# Patient Record
(demographics unavailable — no encounter records)

---

## 2024-10-29 NOTE — PHYSICAL EXAM
[Cerumen in canal] : cerumen in canal [Erythematous Oropharynx] : erythematous oropharynx [No Abnormal Lymph Nodes Palpated] : no abnormal lymph nodes palpated [NL] : warm, clear [Warm] : warm [Clear] : clear [Dry] : dry [Enlarged Tonsils] : tonsils not enlarged [Vesicles] : no vesicles [Exudate] : no exudate

## 2024-10-29 NOTE — REVIEW OF SYSTEMS
[Nasal Congestion] : nasal congestion [Sore Throat] : sore throat [Cough] : cough [Congestion] : congestion [Negative] : Skin [Headache] : no headache [Ear Pain] : no ear pain [Nasal Discharge] : no nasal discharge [Sinus Pressure] : no sinus pressure [Tachypnea] : not tachypneic [Wheezing] : no wheezing [Shortness of Breath] : no shortness of breath

## 2024-10-29 NOTE — END OF VISIT
[FreeTextEntry3] : Keven Mijares MD I reviewed the history & physical exam of patient & discussed with the resident. Agree with assessment & management plan as documented in residents note and addendums made as needed above.

## 2024-10-29 NOTE — DISCUSSION/SUMMARY
[FreeTextEntry1] : Patient is a 13 year old who presents for 1 week of worsening cough and sore throat. Normal exam, no fevers  or SOB. Had negative Covid and flu from urgent care but given symptoms, will send RVP to evaluate for Mycoplasma. Pt is able to tolerate PO and has maintained hydration. Symptoms likely related to post-viral cough and post-nasal drip, no indication for further treatment with antibiotics at this time, no red flags  Plan:  - RVP today - Encouraged supportive measures with humidifier, nasal saline sprays, continued hydrated and motrin - Recommended stopping medications prescribed by urgent care due to side effect profile and lack of efficacy  - RTC if symptoms worsen or new concerns arise.

## 2024-10-29 NOTE — HISTORY OF PRESENT ILLNESS
[FreeTextEntry6] : Patient is a 13 year old F who presents for 1 week of cough and sore throat. Last Sunday developed sore throat and cough which has continued. Congestion started yesterday, no runny nose. Describes the cough is productive with thick white mucous, used to be green/yellow last week. Was seen by Urgent care Sunday, had a negative Covid, Flu and strep throat and they prescribed viscous lidocaine and Bromphen. Mother has also been giving Robitussin and Nyquil. Cough is throughout the day but worst at night and she is not able to fall asleep because of the cough. No chest pain, SOB, fevers, N/V/D, headaches, facial pain. Mother bought her Flonase today that she has used twice. Still having pain with swallowing but is able drink enough to stay hydrated. Cough has been getting progressively worse during the day and at night.

## 2025-03-12 NOTE — PHYSICAL EXAM
[NL] : warm, clear [de-identified] : no edema, erythema noted to rt wrist, full ROM, no pain upon palpation

## 2025-03-12 NOTE — HISTORY OF PRESENT ILLNESS
[FreeTextEntry6] : rt wrist pain x1 year, worsening this past week no known injury denies swelling, denies edema reports noticing redness while performing a plank 2 weeks ago

## 2025-03-12 NOTE — DISCUSSION/SUMMARY
[FreeTextEntry1] : 14 YO here for Rt. Wrist Pain xray ordered Ortho referral ED precautions discussed such as increased pain, edema and/or erythema noted, go to ED RTC for WCC/PRN

## 2025-03-21 NOTE — REVIEW OF SYSTEMS
[Joint Pains] : arthralgias [Joint Swelling] : joint swelling  [Nl] : Musculoskeletal [Change in Activity] : no change in activity

## 2025-03-21 NOTE — DATA REVIEWED
[de-identified] : XR right wrist 3 views performed today 3/20/25: No acute displaced fracture noted. Questionable widening of the distal radioulnar joint

## 2025-03-21 NOTE — HISTORY OF PRESENT ILLNESS
[FreeTextEntry1] : Marilyn is a 13Y female who presents with her father for evaluation of right wrist pain for the past 1 year. The pain is intermittent and localized to the distal aspect of the wrist. The pain is intermittent and is associated with swelling. Patient states that the pain got worse over the past week. She has been utilizing wrist brace for comfort. She states that the pain usually gets worse with writing for long period of time and sometimes while doing planks and push-ups. She has been taking OTC pain medication without significant relief. Denies any radiating pain, numbness or any tingling sensation. Here for orthopedic evaluation and management.

## 2025-03-21 NOTE — END OF VISIT
[FreeTextEntry3] : I, Rachid Barclay MD, I personally performed the services described in the documentation, reviewed the documentation recorded by the scribe in my presence and it accurately and completely records my words and actions

## 2025-03-21 NOTE — ASSESSMENT
[FreeTextEntry1] : Marilyn is a 13Y female with right wrist pain and swelling for the past 1 year Today's visit included obtaining history from the parent due to the child's age, the child could not be considered a reliable historian, requiring parent to act as independent historian  Clinical findings and imaging discussed at length with father and patient. XRs right wrist performed today reviewed at length. No acute displaced fracture noted. Questionable widening of the distal radioulnar joint. Given the fact that the patient has been having chronic right wrist pain for the past 1 year with no improvement with conservative management which includes wrist immobilization and OTC pain medication, I am recommending MRI right wrist to r/o soft tissue abnormalities vs TFCC tear. Our  will contact father once the MRI is approved and authorized by the insurance. She will f/u in 3-4 weeks to review MRI results and further treatment plan. All questions answered. Family and patient verbalize understanding of the plan.   ILuh PA-C have acted as scribe and documented the above for Dr. Barclay

## 2025-03-21 NOTE — PHYSICAL EXAM
[FreeTextEntry1] : Gait: Presents ambulating independently without signs of antalgia.  Good coordination and balance noted. GENERAL: alert, cooperative, in NAD SKIN: The skin is intact, warm, pink and dry over the area examined. EYES: Normal conjunctiva, normal eyelids and pupils were equal and round. ENT: normal ears, normal nose and normal lips. CARDIOVASCULAR: brisk capillary refill, but no peripheral edema. RESPIRATORY: The patient is in no apparent respiratory distress. They're taking full deep breaths without use of accessory muscles or evidence of audible wheezes or stridor without the use of a stethoscope. Normal respiratory effort. ABDOMEN: not examined  Focused exam right wrist No bony deformities, inflammation, or erythema.  Soft tissue swelling right wrist  tenderness to palpation over the distal end of the radius.  Pain with extension, flexion, ulnar and radial deviation without stiffness.  Fingers are warm, pink, and moving freely.  Radial pulse is +2 B/L. Brisk capillary refill in all 5 fingers.  Sensation is intact to light touch distally. Nerve innervation of the hand is intact.

## 2025-03-21 NOTE — DATA REVIEWED
[de-identified] : XR right wrist 3 views performed today 3/20/25: No acute displaced fracture noted. Questionable widening of the distal radioulnar joint

## 2025-03-21 NOTE — REASON FOR VISIT
[Initial Evaluation] : an initial evaluation [Patient] : patient [Father] : father [FreeTextEntry1] : right wrist pain

## 2025-04-15 NOTE — PHYSICAL EXAM

## 2025-04-17 NOTE — DISCUSSION/SUMMARY
[Normal Growth] : growth [Normal Development] : development  [No Elimination Concerns] : elimination [Continue Regimen] : feeding [No Skin Concerns] : skin [Normal Sleep Pattern] : sleep [None] : no medical problems [Anticipatory Guidance Given] : Anticipatory guidance addressed as per the history of present illness section [No Vaccines] : no vaccines needed [No Medications] : ~He/She~ is not on any medications [Patient] : patient [Parent/Guardian] : Parent/Guardian [Full Activity without restrictions including Physical Education & Athletics] : Full Activity without restrictions including Physical Education & Athletics [] : The components of the vaccine(s) to be administered today are listed in the plan of care. The disease(s) for which the vaccine(s) are intended to prevent and the risks have been discussed with the caretaker.  The risks are also included in the appropriate vaccination information statements which have been provided to the patient's caregiver.  The caregiver has given consent to vaccinate. [FreeTextEntry1] : 14 y/o F with pmhx of anxiety presenting for [facility]. Encouraged to follow up with orthopedics to discuss MRI results and need for any possible procedures regarding R wrist pain. Flu vaccine administered today. Continue balanced diet with all food groups. Brush teeth twice a day with toothbrush. Recommend visit to dentist. Maintain consistent daily routines and sleep schedule. Personal hygiene, puberty, and sexual health reviewed. Risky behaviors assessed. School discussed. Limit screen time to no more than 2 hours per day. Encourage physical activity. Return 1 year for routine well child check.  #Health Maintenance - Flu vaccine administered today - Copy of immunization record given - IUTD - Failed vision, referred to optometry   #R wrist pain - F/u with orthopedics

## 2025-04-17 NOTE — HISTORY OF PRESENT ILLNESS
[Mother] : mother [Yes] : Patient goes to dentist yearly [Toothpaste] : Primary Fluoride Source: Toothpaste [Up to date] : Up to date [Normal] : normal [LMP: _____] : LMP: [unfilled] [Days of Bleeding: _____] : Days of bleeding: [unfilled] [Age of Menarche: ____] : Age of Menarche: [unfilled] [Menstrual products used per day: _____] : Menstrual products used per day: [unfilled] [Eats meals with family] : eats meals with family [Has family members/adults to turn to for help] : has family members/adults to turn to for help [Is permitted and is able to make independent decisions] : Is permitted and is able to make independent decisions [Grade: ____] : Grade: [unfilled] [Normal Performance] : normal performance [Normal Behavior/Attention] : normal behavior/attention [Normal Homework] : normal homework [Eats regular meals including adequate fruits and vegetables] : eats regular meals including adequate fruits and vegetables [Drinks non-sweetened liquids] : drinks non-sweetened liquids  [Calcium source] : calcium source [Has friends] : has friends [At least 1 hour of physical activity a day] : at least 1 hour of physical activity a day [Screen time (except homework) less than 2 hours a day] : screen time (except homework) less than 2 hours a day [Has interests/participates in community activities/volunteers] : has interests/participates in community activities/volunteers. [Uses safety belts/safety equipment] : uses safety belts/safety equipment  [Has peer relationships free of violence] : has peer relationships free of violence [No] : Patient has not had sexual intercourse [HIV Screening Declined] : HIV Screening Declined [Has ways to cope with stress] : has ways to cope with stress [Displays self-confidence] : displays self-confidence [With Teen] : teen [NO] : No [Irregular menses] : no irregular menses [Heavy Bleeding] : no heavy bleeding [Painful Cramps] : no painful cramps [Hirsutism] : no hirsutism [Acne] : no acne [Tampon Use] : no tampon use [Sleep Concerns] : no sleep concerns [Has concerns about body or appearance] : does not have concerns about body or appearance [Uses electronic nicotine delivery system] : does not use electronic nicotine delivery system [Exposure to electronic nicotine delivery system] : no exposure to electronic nicotine delivery system [Uses tobacco] : does not use tobacco [Exposure to tobacco] : no exposure to tobacco [Uses drugs] : does not use drugs  [Exposure to drugs] : no exposure to drugs [Drinks alcohol] : does not drink alcohol [Exposure to alcohol] : no exposure to alcohol [Has problems with sleep] : does not have problems with sleep [Gets depressed, anxious, or irritable/has mood swings] : does not get depressed, anxious, or irritable/has mood swings [Has thought about hurting self or considered suicide] : has not thought about hurting self or considered suicide [FreeTextEntry7] : No acute interval events [de-identified] : None [de-identified] : Lives at home with parents and two brothers, feels safe at home [de-identified] : Is getting good grades, does not have any conflicts with peers or teachers, no issues with bullying [de-identified] : 3 meals per day with snacks in between from all food groups, no skipped meals [de-identified] : Plays volleyball almost daily as part of gym class [FreeTextEntry1] : 14 y/o F with pmhx of anxiety presenting for [facility]. No acute interval events, no recent illnesses, trips to the ED or urgent care. Pt endorses having vision evaluated by eye doctor in Sept 2024 and received new glasses prescription which she has been compliant with wearing. She states that her vision is fine with the glasses but forgot to bring them in today. Pt also has braces and is routinely evaluated by orthodontics. She endorses R wrist pain for the past several months and has appointment scheduled with orthopedics today. She had an MRI of her wrist performed of which the results will be discussed today. Marilyn has been seeing her therapist once a week and has good relationship with them. States that she has anxiety related to amount of school work, however, attending therapy has been helping. Does not feel that she needs to see therapist more often or start psychiatric medications. Denies self-injurious behaviors, denies SI/HI.   Denies headaches, vision changes, dizziness, cough, congestion, SOB, chest pain, abd pain, n/v/d/c, rashes, changes in gait.

## 2025-04-17 NOTE — DISCUSSION/SUMMARY
[Normal Growth] : growth [Normal Development] : development  [No Elimination Concerns] : elimination [Continue Regimen] : feeding [No Skin Concerns] : skin [Normal Sleep Pattern] : sleep [None] : no medical problems [Anticipatory Guidance Given] : Anticipatory guidance addressed as per the history of present illness section [No Vaccines] : no vaccines needed [No Medications] : ~He/She~ is not on any medications [Patient] : patient [Parent/Guardian] : Parent/Guardian [Full Activity without restrictions including Physical Education & Athletics] : Full Activity without restrictions including Physical Education & Athletics [] : The components of the vaccine(s) to be administered today are listed in the plan of care. The disease(s) for which the vaccine(s) are intended to prevent and the risks have been discussed with the caretaker.  The risks are also included in the appropriate vaccination information statements which have been provided to the patient's caregiver.  The caregiver has given consent to vaccinate. [FreeTextEntry1] : 14 y/o F with pmhx of anxiety presenting for [facility]. Encouraged to follow up with orthopedics to discuss MRI results and need for any possible procedures regarding R wrist pain. Flu vaccine administered today. Continue balanced diet with all food groups. Brush teeth twice a day with toothbrush. Recommend visit to dentist. Maintain consistent daily routines and sleep schedule. Personal hygiene, puberty, and sexual health reviewed. Risky behaviors assessed. School discussed. Limit screen time to no more than 2 hours per day. Encourage physical activity. Return 1 year for routine well child check.  #Health Maintenance - Flu vaccine administered today - Copy of immunization record given - IUTD - Failed vision, referred to optometry   #R wrist pain - F/u with orthopedics

## 2025-04-17 NOTE — HISTORY OF PRESENT ILLNESS
[Mother] : mother [Yes] : Patient goes to dentist yearly [Toothpaste] : Primary Fluoride Source: Toothpaste [Up to date] : Up to date [Normal] : normal [LMP: _____] : LMP: [unfilled] [Days of Bleeding: _____] : Days of bleeding: [unfilled] [Age of Menarche: ____] : Age of Menarche: [unfilled] [Menstrual products used per day: _____] : Menstrual products used per day: [unfilled] [Eats meals with family] : eats meals with family [Has family members/adults to turn to for help] : has family members/adults to turn to for help [Is permitted and is able to make independent decisions] : Is permitted and is able to make independent decisions [Grade: ____] : Grade: [unfilled] [Normal Performance] : normal performance [Normal Behavior/Attention] : normal behavior/attention [Normal Homework] : normal homework [Eats regular meals including adequate fruits and vegetables] : eats regular meals including adequate fruits and vegetables [Drinks non-sweetened liquids] : drinks non-sweetened liquids  [Calcium source] : calcium source [Has friends] : has friends [At least 1 hour of physical activity a day] : at least 1 hour of physical activity a day [Screen time (except homework) less than 2 hours a day] : screen time (except homework) less than 2 hours a day [Has interests/participates in community activities/volunteers] : has interests/participates in community activities/volunteers. [Uses safety belts/safety equipment] : uses safety belts/safety equipment  [Has peer relationships free of violence] : has peer relationships free of violence [No] : Patient has not had sexual intercourse [HIV Screening Declined] : HIV Screening Declined [Has ways to cope with stress] : has ways to cope with stress [Displays self-confidence] : displays self-confidence [With Teen] : teen [NO] : No [Irregular menses] : no irregular menses [Heavy Bleeding] : no heavy bleeding [Painful Cramps] : no painful cramps [Hirsutism] : no hirsutism [Acne] : no acne [Tampon Use] : no tampon use [Sleep Concerns] : no sleep concerns [Has concerns about body or appearance] : does not have concerns about body or appearance [Uses electronic nicotine delivery system] : does not use electronic nicotine delivery system [Exposure to electronic nicotine delivery system] : no exposure to electronic nicotine delivery system [Uses tobacco] : does not use tobacco [Exposure to tobacco] : no exposure to tobacco [Uses drugs] : does not use drugs  [Exposure to drugs] : no exposure to drugs [Drinks alcohol] : does not drink alcohol [Exposure to alcohol] : no exposure to alcohol [Has problems with sleep] : does not have problems with sleep [Gets depressed, anxious, or irritable/has mood swings] : does not get depressed, anxious, or irritable/has mood swings [Has thought about hurting self or considered suicide] : has not thought about hurting self or considered suicide [FreeTextEntry7] : No acute interval events [de-identified] : None [de-identified] : Lives at home with parents and two brothers, feels safe at home [de-identified] : Is getting good grades, does not have any conflicts with peers or teachers, no issues with bullying [de-identified] : 3 meals per day with snacks in between from all food groups, no skipped meals [de-identified] : Plays volleyball almost daily as part of gym class [FreeTextEntry1] : 14 y/o F with pmhx of anxiety presenting for [facility]. No acute interval events, no recent illnesses, trips to the ED or urgent care. Pt endorses having vision evaluated by eye doctor in Sept 2024 and received new glasses prescription which she has been compliant with wearing. She states that her vision is fine with the glasses but forgot to bring them in today. Pt also has braces and is routinely evaluated by orthodontics. She endorses R wrist pain for the past several months and has appointment scheduled with orthopedics today. She had an MRI of her wrist performed of which the results will be discussed today. Marilyn has been seeing her therapist once a week and has good relationship with them. States that she has anxiety related to amount of school work, however, attending therapy has been helping. Does not feel that she needs to see therapist more often or start psychiatric medications. Denies self-injurious behaviors, denies SI/HI.   Denies headaches, vision changes, dizziness, cough, congestion, SOB, chest pain, abd pain, n/v/d/c, rashes, changes in gait.

## 2025-04-17 NOTE — RISK ASSESSMENT
[0] : 2) Feeling down, depressed, or hopeless: Not at all (0) [No Increased risk of SCA or SCD] : No Increased risk of SCA or SCD    [No] : Not discussed with patient. [[id number]] : 0 [Have you ever fainted, passed out or had an unexplained seizure suddenly and without warning, especially during exercise or in response] : Have you ever fainted, passed out or had an unexplained seizure suddenly and without warning, especially during exercise or in response to sudden loud noises such as doorbells, alarm clocks and ringing telephones? No [Have you ever had exercise-related chest pain or shortness of breath?] : Have you ever had exercise-related chest pain or shortness of breath? No [Has anyone in your immediate family (parents, grandparents, siblings) or other more distant relatives (aunts, uncles, cousins)  of heart] : Has anyone in your immediate family (parents, grandparents, siblings) or other more distant relatives (aunts, uncles, cousins)  of heart problems or had an unexpected sudden death before age 50 (This would include unexpected drownings, unexplained car accidents in which the relative was driving or sudden infant death syndrome.)? No [Are you related to anyone with hypertrophic cardiomyopathy or hypertrophic obstructive cardiomyopathy, Marfan syndrome, arrhythmogenic] : Are you related to anyone with hypertrophic cardiomyopathy or hypertrophic obstructive cardiomyopathy, Marfan syndrome, arrhythmogenic right ventricular cardiomyopathy, long QT syndrome, short QT syndrome, Brugada syndrome or catecholaminergic polymorphic ventricular tachycardia, or anyone younger than 50 years with a pacemaker or implantable defibrillator? No

## 2025-04-17 NOTE — RISK ASSESSMENT
[0] : 2) Feeling down, depressed, or hopeless: Not at all (0) [No Increased risk of SCA or SCD] : No Increased risk of SCA or SCD    [No] : Not discussed with patient. [[id number]] : 0 [Have you ever fainted, passed out or had an unexplained seizure suddenly and without warning, especially during exercise or in response] : Have you ever fainted, passed out or had an unexplained seizure suddenly and without warning, especially during exercise or in response to sudden loud noises such as doorbells, alarm clocks and ringing telephones? No [Have you ever had exercise-related chest pain or shortness of breath?] : Have you ever had exercise-related chest pain or shortness of breath? No [Has anyone in your immediate family (parents, grandparents, siblings) or other more distant relatives (aunts, uncles, cousins)  of heart] : Has anyone in your immediate family (parents, grandparents, siblings) or other more distant relatives (aunts, uncles, cousins)  of heart problems or had an unexpected sudden death before age 50 (This would include unexpected drownings, unexplained car accidents in which the relative was driving or sudden infant death syndrome.)? No [Are you related to anyone with hypertrophic cardiomyopathy or hypertrophic obstructive cardiomyopathy, Marfan syndrome, arrhythmogenic] : Are you related to anyone with hypertrophic cardiomyopathy or hypertrophic obstructive cardiomyopathy, Marfan syndrome, arrhythmogenic right ventricular cardiomyopathy, long QT syndrome, short QT syndrome, Brugada syndrome or catecholaminergic polymorphic ventricular tachycardia, or anyone younger than 50 years with a pacemaker or implantable defibrillator? No

## 2025-06-20 NOTE — REASON FOR VISIT
[Follow Up] : a follow up visit [Patient] : patient [Mother] : mother [FreeTextEntry1] : right wrist pain

## 2025-06-20 NOTE — DATA REVIEWED
[de-identified] : MRI right wrist 3/30/25: The triangular fibrocartilage complex is intact. There is mild extensor carpi ulnaris tendinosis with superimposed focal longitudinal splitting at the level of the ulnar styloid.

## 2025-06-20 NOTE — PHYSICAL EXAM
[FreeTextEntry1] : Gait: Presents ambulating independently without signs of antalgia.  Good coordination and balance noted. GENERAL: alert, cooperative, in NAD SKIN: The skin is intact, warm, pink and dry over the area examined. EYES: Normal conjunctiva, normal eyelids and pupils were equal and round. ENT: normal ears, normal nose and normal lips. CARDIOVASCULAR: brisk capillary refill, but no peripheral edema. RESPIRATORY: The patient is in no apparent respiratory distress. They're taking full deep breaths without use of accessory muscles or evidence of audible wheezes or stridor without the use of a stethoscope. Normal respiratory effort. ABDOMEN: not examined  Focused exam right wrist No bony deformities, inflammation, or erythema.  Soft tissue swelling right wrist  tenderness to palpation over the distal end of the ulna Pain with extension, flexion, ulnar and radial deviation without stiffness.  Fingers are warm, pink, and moving freely.  Radial pulse is +2 B/L. Brisk capillary refill in all 5 fingers.  Sensation is intact to light touch distally. Nerve innervation of the hand is intact.

## 2025-06-20 NOTE — HISTORY OF PRESENT ILLNESS
[FreeTextEntry1] : Marilyn is a 13Y female who presents with her mother for follow up of right wrist pain for the past 1 year. The pain is intermittent and localized to the distal aspect of the wrist. She has associated swelling of the wrist. She has been utilizing wrist brace for comfort. She states that the pain usually gets worse with writing for long period of time and sometimes while doing planks and push-ups. She has been taking OTC pain medication without significant relief. Denies any radiating pain, numbness or any tingling sensation. Last seen March 2025 and MRI right wrist. Please see prior notes for further information.   Today, she returns with persistent right wrist pain. Since the last visit, she was seen by Dr. Stallworth and had MRI right wrist performed. Per mother, she was diagnosed with tendinitis. She was prescribed Meloxicam and recommend [place] splint. Mother states that she had adverse reaction to Meloxicam. Patient developed headache, nausea and abdominal pain. Hence, Meloxicam was discontinued. She is scheduled for brace fitting on 7/2/25. Mother is here for possible another opinion in regard to medication and brace.

## 2025-06-20 NOTE — ASSESSMENT
[FreeTextEntry1] : Marilyn is a 13Y female with right wrist pain and swelling for the past 1 year consistent with right extensor carpi ulnaris tendinitis as seen on MRI  Today's visit included obtaining history from the parent due to the child's age, the child could not be considered a reliable historian, requiring parent to act as independent historian  Clinical findings and imaging discussed at length with mother and patient. 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 At this time, I am recommending following with hand specialist  for further evaluation. She can continue with wrist immobilizer for comfort until her custom brace is delivered. NSAIDs as needed for pain relief. She will f/u with us on prn basis. All questions answered. Family and patient verbalize understanding of the plan.   ILhu PA-C have acted as scribe and documented the above for Dr. Barclay